# Patient Record
Sex: FEMALE | Race: OTHER | NOT HISPANIC OR LATINO | URBAN - METROPOLITAN AREA
[De-identification: names, ages, dates, MRNs, and addresses within clinical notes are randomized per-mention and may not be internally consistent; named-entity substitution may affect disease eponyms.]

---

## 2022-04-29 ENCOUNTER — HOSPITAL ENCOUNTER (OUTPATIENT)
Dept: BONE DENSITY | Facility: MEDICAL CENTER | Age: 57
Discharge: HOME | End: 2022-04-29
Payer: COMMERCIAL

## 2022-04-29 DIAGNOSIS — M85.80 OSTEOPENIA, UNSPECIFIED LOCATION: ICD-10-CM

## 2022-04-29 PROCEDURE — 77080 DXA BONE DENSITY AXIAL: CPT

## 2022-05-03 NOTE — RESULT ENCOUNTER NOTE
Please call the patient regarding her abnormal result  Bone density scan showed slight bone loss needs calcium and vitamin d like oscal d 500 bid for life

## 2022-10-17 ENCOUNTER — HOSPITAL ENCOUNTER (OUTPATIENT)
Dept: RADIOLOGY | Facility: HOSPITAL | Age: 57
Discharge: HOME | End: 2022-10-17
Payer: COMMERCIAL

## 2022-10-17 ENCOUNTER — HOSPITAL ENCOUNTER (OUTPATIENT)
Dept: NON INVASIVE DIAGNOSTICS | Facility: HOSPITAL | Age: 57
Discharge: HOME | End: 2022-10-17
Payer: COMMERCIAL

## 2022-10-17 VITALS — WEIGHT: 150 LBS | HEIGHT: 63 IN | BODY MASS INDEX: 26.58 KG/M2

## 2022-10-17 DIAGNOSIS — R06.02 SHORTNESS OF BREATH: ICD-10-CM

## 2022-10-17 LAB
BASELINE ST DEPRESSION: 0 MM
MAX HR PERCENT: 99 %
MAX HR: 162 BPM
RATE PRESSURE PRODUCT: NORMAL
SL CV STRESS RECOVERY BP: NORMAL MMHG
SL CV STRESS RECOVERY HR: 75 BPM
SL CV STRESS RECOVERY O2 SAT: 99 %
SL CV STRESS STAGE REACHED: 2
STRESS ANGINA INDEX: 0
STRESS BASELINE BP: NORMAL MMHG
STRESS BASELINE HR: 65 BPM
STRESS DUKE TREADMILL SCORE: -4
STRESS O2 SAT REST: 100 %
STRESS PEAK HR: 162 BPM
STRESS POST ESTIMATED WORKLOAD: 7 METS
STRESS POST EXERCISE DUR MIN: 6 MIN
STRESS POST O2 SAT PEAK: 100 %
STRESS POST PEAK BP: 170 MMHG
STRESS ST DEPRESSION: 2 MM

## 2022-10-17 PROCEDURE — 93017 CV STRESS TEST TRACING ONLY: CPT

## 2022-10-17 PROCEDURE — 93018 CV STRESS TEST I&R ONLY: CPT

## 2022-10-17 PROCEDURE — 93016 CV STRESS TEST SUPVJ ONLY: CPT

## 2022-10-17 PROCEDURE — 71046 X-RAY EXAM CHEST 2 VIEWS: CPT

## 2022-10-18 LAB
CHEST PAIN STATEMENT: NORMAL
MAX DIASTOLIC BP: 86 MMHG
MAX HEART RATE: 162 BPM
MAX PREDICTED HEART RATE: 163 BPM
MAX. SYSTOLIC BP: 170 MMHG
PROTOCOL NAME: NORMAL
TARGET HR FORMULA: NORMAL
TEST INDICATION: NORMAL
TIME IN EXERCISE PHASE: NORMAL

## 2023-01-20 ENCOUNTER — OFFICE VISIT (OUTPATIENT)
Dept: CARDIOLOGY CLINIC | Facility: CLINIC | Age: 58
End: 2023-01-20

## 2023-01-20 VITALS
WEIGHT: 155 LBS | SYSTOLIC BLOOD PRESSURE: 100 MMHG | BODY MASS INDEX: 27.46 KG/M2 | HEART RATE: 69 BPM | DIASTOLIC BLOOD PRESSURE: 70 MMHG | HEIGHT: 63 IN | OXYGEN SATURATION: 98 %

## 2023-01-20 DIAGNOSIS — R06.02 SHORTNESS OF BREATH: ICD-10-CM

## 2023-01-20 DIAGNOSIS — R94.39 ABNORMAL STRESS ECG WITH TREADMILL: Primary | ICD-10-CM

## 2023-01-20 DIAGNOSIS — E78.2 MIXED HYPERLIPIDEMIA: ICD-10-CM

## 2023-01-20 PROBLEM — E78.5 HYPERLIPIDEMIA: Status: ACTIVE | Noted: 2023-01-20

## 2023-01-20 RX ORDER — ASPIRIN 81 MG/1
81 TABLET ORAL DAILY
Qty: 30 TABLET | Refills: 11 | Status: SHIPPED | OUTPATIENT
Start: 2023-01-20

## 2023-01-20 NOTE — PROGRESS NOTES
[facility]    Name:Edmund Brian   DOS: 1/20/2023     Chief Complaint:   Chief Complaint   Patient presents with   • New Patient Visit     Stress test abnormal, sob  • Palpitations     Off and on , fast, rapid heart beats about once a month, get sob when it happens   • Shortness of Breath     Off and on at rest    • Dizziness     Only last week, dizziness with nausea       HISTORY OF PRESENT ILLNESS:      HPI:  Sofia Nelson is a 62 y o  female  Past medical history is significant for osteoporosis, hyperlipidemia, family history of CAD  Presents to establish care for evaluation of a positive stress test  The study was ordered for evaluation of dyspnea at rest and with exertion  She is primarily Sami speaking, and interpretation was assisted by her daughter who is fluent in both Sami and [employer]  The patient was offered an Sami interpretor via iPad, but declined this service  Today, the patient reports that she has no residual active cardiopulmonary complaints  The patient has no chest pain at rest or with exertion  She additionally denies diaphoresis, dizziness, palpitations, orthopnea, PND, edema, syncope, claudication  She states that the level of exertion achieved during her stress test was substantially higher than her usual level of exertion on a daily basis  Her mother has had multiple MIs  Grandmother with MI and stent placement  Brother of an MI at 79 which he passed away from  The 10-year ASCVD risk score (Irvin DK, et al , 2019) is: 1 9%    Values used to calculate the score:      Age: 62 years      Sex: Female      Is Non- : No      Diabetic: No      Tobacco smoker: No      Systolic Blood Pressure: 530 mmHg      Is BP treated: No      HDL Cholesterol: 60 mg/dL      Total Cholesterol: 273 mg/dL     ROS    ROS: Pertinent positives and negatives as described in History of Present Illness   Remainder of a 14 point review of systems was negative  No Known Allergies     Current Outpatient Medications on File Prior to Visit   Medication Sig Dispense Refill   • alendronate (Fosamax) 70 mg tablet Take once weekly 12 tablet 3   • calcium carbonate-vitamin D (OSCAL-D) 500 mg-200 units per tablet Take 1 tablet by mouth 2 (two) times a day with meals 180 tablet 3   • cyclobenzaprine (FLEXERIL) 5 mg tablet Take 1 tablet (5 mg total) by mouth 3 (three) times a day as needed for muscle spasms for up to 30 doses (Patient not taking: Reported on 1/20/2023) 30 tablet 0   • ergocalciferol (VITAMIN D2) 50,000 units Take 1 capsule (50,000 Units total) by mouth once a week (Patient not taking: Reported on 1/20/2023) 12 capsule 2   • methylPREDNISolone 4 MG tablet therapy pack Use as directed on package (Patient not taking: Reported on 1/20/2023) 21 each 0   • naproxen (NAPROSYN) 500 mg tablet Take 1 tablet (500 mg total) by mouth 2 (two) times a day with meals (Patient not taking: Reported on 1/20/2023) 20 tablet 0   • rosuvastatin (CRESTOR) 10 MG tablet Take 1 tablet (10 mg total) by mouth daily (Patient not taking: Reported on 1/20/2023) 90 tablet 3     No current facility-administered medications on file prior to visit  No past medical history on file  No past surgical history on file  No family history on file      Social History     Socioeconomic History   • Marital status: /Civil Union     Spouse name: Not on file   • Number of children: Not on file   • Years of education: Not on file   • Highest education level: Not on file   Occupational History   • Not on file   Tobacco Use   • Smoking status: Never   • Smokeless tobacco: Never   Substance and Sexual Activity   • Alcohol use: Not on file   • Drug use: Not on file   • Sexual activity: Not on file   Other Topics Concern   • Not on file   Social History Narrative   • Not on file     Social Determinants of Health     Financial Resource Strain: Not on file   Food Insecurity: Not on file Transportation Needs: Not on file   Physical Activity: Not on file   Stress: Not on file   Social Connections: Not on file   Intimate Partner Violence: Not on file   Housing Stability: Not on file       OBJECTIVE:    /70 (BP Location: Left arm, Patient Position: Sitting, Cuff Size: Standard)   Pulse 69   Ht 5' 3" (1 6 m)   Wt 70 3 kg (155 lb)   SpO2 98%   BMI 27 46 kg/m²      BP Readings from Last 3 Encounters:   01/20/23 100/70   12/20/22 138/90   09/29/22 114/62       Wt Readings from Last 3 Encounters:   01/20/23 70 3 kg (155 lb)   12/20/22 70 8 kg (156 lb)   10/17/22 68 kg (150 lb)         Physical Exam  Vitals reviewed  Constitutional:       General: She is not in acute distress  Appearance: Normal appearance  She is not diaphoretic  HENT:      Head: Normocephalic and atraumatic  Eyes:      Conjunctiva/sclera: Conjunctivae normal    Neck:      Vascular: No carotid bruit or JVD  Cardiovascular:      Rate and Rhythm: Normal rate and regular rhythm  Pulses: Normal pulses  Heart sounds: Normal heart sounds  No murmur heard  No friction rub  No gallop  Pulmonary:      Effort: Pulmonary effort is normal       Breath sounds: Normal breath sounds  No wheezing, rhonchi or rales  Abdominal:      General: Abdomen is flat  Bowel sounds are normal  There is no distension  Palpations: Abdomen is soft  Musculoskeletal:      Right lower leg: No edema  Left lower leg: No edema  Skin:     General: Skin is warm and dry  Neurological:      General: No focal deficit present  Mental Status: She is alert and oriented to person, place, and time  Psychiatric:         Mood and Affect: Mood normal          Behavior: Behavior normal                                                        Cardiac testing:   EKG reviewed personally: Normal sinus rhythm            LABS:  Lab Results   Component Value Date    BUN 16 10/10/2022    CREATININE 0 53 10/10/2022    CALCIUM 9 4 10/10/2022    K 4 0 10/10/2022    CO2 30 10/10/2022     10/10/2022    ALKPHOS 47 10/10/2022    AST 14 10/10/2022    ALT 12 10/10/2022        Lab Results   Component Value Date    WBC 4 7 10/10/2022    HGB 13 4 10/10/2022    HCT 40 2 10/10/2022    MCV 85 9 10/10/2022     (L) 10/10/2022       Lab Results   Component Value Date    HDL 60 10/10/2022    LDLCALC 185 (H) 10/10/2022    TRIG 142 10/10/2022       No results found for: HGBA1C    No results found for: TSH      ASSESSMENT/PLAN:  Diagnoses and all orders for this visit:    Abnormal stress ECG with treadmill  - Positive exercise stress study, although I suspect that this is an incidental finding as the patient's initial complaint was seemingly non-cardiac by history  Discussed the role for medical management vs further evaluation via cath or CTA  I discussed the sensitivity and specificity of an exercise stress ECG in a low-intermediate pretest risk patient  Patient prefers a definitive diagnosis prior to pursuing coronary angiography, and therefore I have recommended a CTA for evaluation of coronary anatomy and assess for flow limiting coronary disease  Start Aspirin 81mg QD  Metoprolol tartrate to be started 2 days prior to her CTA to achieve a heart rate of 50-60bpm for the study    -     POCT ECG  -     aspirin (ECOTRIN LOW STRENGTH) 81 mg EC tablet; Take 1 tablet (81 mg total) by mouth daily  -     CTA cardiac; Future  -     Basic metabolic panel; Future  -     metoprolol tartrate (LOPRESSOR) 25 mg tablet; Take 0 5 tablets (12 5 mg total) by mouth every 12 (twelve) hours    Shortness of breath  Suspect that this is her anginal equivalent  Workup as noted above  -     POCT ECG    Mixed hyperlipidemia  - Restart Crestor 10mg QD  Recheck lipids in 3 months  -     Lipid Panel with Direct LDL reflex;  Future                Nilam Clinton MD

## 2023-01-20 NOTE — PATIENT INSTRUCTIONS
You were seen today in the Cardiology office for evaluation of a positive stress test      Please continue your current cardiac medications as prescribed  Please make the following changes to your cardiac medications:  Start taking Aspirin 81mg once daily  Thank you for choosing [address]  Please call our office or use [employer] with any questions

## 2023-03-16 ENCOUNTER — HOSPITAL ENCOUNTER (OUTPATIENT)
Dept: CT IMAGING | Facility: HOSPITAL | Age: 58
Discharge: HOME | End: 2023-03-16
Attending: INTERNAL MEDICINE

## 2023-03-16 VITALS — DIASTOLIC BLOOD PRESSURE: 78 MMHG | HEART RATE: 59 BPM | SYSTOLIC BLOOD PRESSURE: 121 MMHG | RESPIRATION RATE: 20 BRPM

## 2023-03-16 DIAGNOSIS — R94.39 ABNORMAL STRESS ECG WITH TREADMILL: ICD-10-CM

## 2023-03-16 RX ORDER — METOPROLOL TARTRATE 5 MG/5ML
5 INJECTION INTRAVENOUS
Status: DISCONTINUED | OUTPATIENT
Start: 2023-03-16 | End: 2023-03-17 | Stop reason: HOSPADM

## 2023-03-16 RX ORDER — IODIXANOL 320 MG/ML
100 INJECTION, SOLUTION INTRAVASCULAR
Status: COMPLETED | OUTPATIENT
Start: 2023-03-16 | End: 2023-03-16

## 2023-03-16 RX ORDER — NITROGLYCERIN 0.4 MG/1
0.4 TABLET SUBLINGUAL ONCE
Status: COMPLETED | OUTPATIENT
Start: 2023-03-16 | End: 2023-03-16

## 2023-03-16 RX ADMIN — NITROGLYCERIN 0.4 MG: 0.4 TABLET SUBLINGUAL at 11:15

## 2023-03-16 RX ADMIN — IODIXANOL 100 ML: 320 INJECTION, SOLUTION INTRAVASCULAR at 11:22

## 2023-03-16 NOTE — NURSING NOTE
Patient arrived for cardiac cta  Test/medications reviewed  Pt accompanied by family member to drive and interpret, [employer]  declined  See vitals flowsheet  Pt tolerated test well  Encouraged increased fluids remainder of day  Asymptomatic upon departure with family

## 2023-03-24 ENCOUNTER — OFFICE VISIT (OUTPATIENT)
Dept: CARDIOLOGY CLINIC | Facility: CLINIC | Age: 58
End: 2023-03-24

## 2023-03-24 VITALS
OXYGEN SATURATION: 96 % | DIASTOLIC BLOOD PRESSURE: 60 MMHG | BODY MASS INDEX: 27.63 KG/M2 | HEART RATE: 66 BPM | WEIGHT: 156 LBS | SYSTOLIC BLOOD PRESSURE: 114 MMHG

## 2023-03-24 DIAGNOSIS — R94.39 ABNORMAL STRESS ECG WITH TREADMILL: Primary | ICD-10-CM

## 2023-03-24 DIAGNOSIS — E78.2 MIXED HYPERLIPIDEMIA: ICD-10-CM

## 2023-03-24 DIAGNOSIS — R06.02 SHORTNESS OF BREATH: ICD-10-CM

## 2023-03-24 NOTE — PATIENT INSTRUCTIONS
You were seen today in the Cardiology office for follow up evaluation  Please continue your current cardiac medications as prescribed  Thank you for choosing [address]  Please call our office or use [employer] with any questions

## 2023-03-27 NOTE — PROGRESS NOTES
[facility]    Name:Edmund Brian   DOS: 3/26/2023     Chief Complaint:   Chief Complaint   Patient presents with   • Follow-up       HISTORY OF PRESENT ILLNESS:      HPI:  Kira Nugent is a 62 y o  female  She  has no past medical history on file  She presents for follow up evaluation  Last saw me in the office on 1/20/23  Per my OV note at that time:  Presents to establish care for evaluation of a positive stress test  The study was ordered for evaluation of dyspnea at rest and with exertion  She states that the level of exertion achieved during her stress test was substantially higher than her usual level of exertion on a daily basis  Her mother has had multiple MIs  Grandmother with MI and stent placement  Brother of an MI at 79 which he passed away from  Based upon my personal review of her stress test and her family history, I had referred her for a CTA to exclude flow limiting coronary disease  The study demonstrated no occlusive coronary disease, and noted a coronary calcium score of 0  The patient today reports no active cardiopulmonary complaints, and specifically denies chest pain, shortness of breath, diaphoresis, dizziness, palpitations, orthopnea, PND, edema, syncope  She has not been taking rosuvastatin, despite being recommended to do so by her PCP  ROS    ROS: Pertinent positives and negatives as described in History of Present Illness  Remainder of a 14 point review of systems was negative       No Known Allergies     Current Outpatient Medications on File Prior to Visit   Medication Sig Dispense Refill   • alendronate (Fosamax) 70 mg tablet Take once weekly 12 tablet 3   • cyclobenzaprine (FLEXERIL) 5 mg tablet Take 1 tablet (5 mg total) by mouth 3 (three) times a day as needed for muscle spasms for up to 30 doses 30 tablet 0   • ergocalciferol (VITAMIN D2) 50,000 units Take 1 capsule (50,000 Units total) by mouth once a week 12 capsule 2   • meclizine (ANTIVERT) 25 mg tablet Take 1 tablet (25 mg total) by mouth every 8 (eight) hours 30 tablet 0   • methylPREDNISolone 4 MG tablet therapy pack Use as directed on package 21 each 0   • metoprolol tartrate (LOPRESSOR) 25 mg tablet Take 0 5 tablets (12 5 mg total) by mouth every 12 (twelve) hours 5 tablet 0   • naproxen (NAPROSYN) 500 mg tablet Take 1 tablet (500 mg total) by mouth 2 (two) times a day with meals 20 tablet 0   • rosuvastatin (CRESTOR) 10 MG tablet Take 1 tablet (10 mg total) by mouth daily 90 tablet 3   • calcium carbonate-vitamin D (OSCAL-D) 500 mg-200 units per tablet Take 1 tablet by mouth 2 (two) times a day with meals 180 tablet 3     No current facility-administered medications on file prior to visit  No past medical history on file  No past surgical history on file  No family history on file      Social History     Socioeconomic History   • Marital status: /Civil Union     Spouse name: Not on file   • Number of children: Not on file   • Years of education: Not on file   • Highest education level: Not on file   Occupational History   • Not on file   Tobacco Use   • Smoking status: Never   • Smokeless tobacco: Never   Substance and Sexual Activity   • Alcohol use: Not on file   • Drug use: Not on file   • Sexual activity: Not on file   Other Topics Concern   • Not on file   Social History Narrative   • Not on file     Social Determinants of Health     Financial Resource Strain: Not on file   Food Insecurity: Not on file   Transportation Needs: Not on file   Physical Activity: Not on file   Stress: Not on file   Social Connections: Not on file   Intimate Partner Violence: Not on file   Housing Stability: Not on file       OBJECTIVE:    /60 (BP Location: Right arm, Patient Position: Sitting, Cuff Size: Standard)   Pulse 66   Wt 70 8 kg (156 lb)   SpO2 96%   BMI 27 63 kg/m²      BP Readings from Last 3 Encounters:   03/24/23 114/60   03/16/23 121/78   01/20/23 100/70 Wt Readings from Last 3 Encounters:   03/24/23 70 8 kg (156 lb)   01/20/23 70 3 kg (155 lb)   12/20/22 70 8 kg (156 lb)         Physical Exam  Vitals reviewed  Constitutional:       General: She is not in acute distress  Appearance: Normal appearance  She is not diaphoretic  HENT:      Head: Normocephalic and atraumatic  Eyes:      Conjunctiva/sclera: Conjunctivae normal    Neck:      Vascular: No JVD  Cardiovascular:      Rate and Rhythm: Normal rate and regular rhythm  Pulses: Normal pulses  Heart sounds: Normal heart sounds  No murmur heard  No friction rub  No gallop  Pulmonary:      Effort: Pulmonary effort is normal       Breath sounds: Normal breath sounds  No wheezing, rhonchi or rales  Abdominal:      General: Abdomen is flat  Bowel sounds are normal  There is no distension  Palpations: Abdomen is soft  Musculoskeletal:      Right lower leg: No edema  Left lower leg: No edema  Skin:     General: Skin is warm and dry  Neurological:      General: No focal deficit present  Mental Status: She is alert and oriented to person, place, and time  Psychiatric:         Mood and Affect: Mood normal          Behavior: Behavior normal                                                        Cardiac testing:   CTA Coronary 3/16/23:  No coronary artery atherosclerotic disease      CAD-RADS 0     - Degree of maximal coronary stenosis = 0%   - Documented absence of coronary artery disease          LABS:  Lab Results   Component Value Date    BUN 16 10/10/2022    CREATININE 0 53 10/10/2022    CALCIUM 9 4 10/10/2022    K 4 0 10/10/2022    CO2 30 10/10/2022     10/10/2022    ALKPHOS 47 10/10/2022    AST 14 10/10/2022    ALT 12 10/10/2022        Lab Results   Component Value Date    WBC 4 7 10/10/2022    HGB 13 4 10/10/2022    HCT 40 2 10/10/2022    MCV 85 9 10/10/2022     (L) 10/10/2022       Lab Results   Component Value Date    HDL 60 10/10/2022    [address] 185 (H) 10/10/2022    TRIG 142 10/10/2022       No results found for: HGBA1C    No results found for: TSH      ASSESSMENT/PLAN:  Diagnoses and all orders for this visit:    Abnormal stress ECG with treadmill  Shortness of breath  Likely a false positive exercise stress ECG in the setting of low pre-test risk to start based upon history  Her CTA demonstrates no obstructive CAD with zero coronary calcium  I provided reassurance, and have advised the patient regarding a heart healthy diet  I stressed the importance of regular exercise, and compliance with her statin  I did explain that the absence of flow limiting disease/coronary calcium on her study does exclude the risk of having an MI in the near or distant future  We reviewed the pathophysiology of plaque rupture  She was counseled on red flag symptoms that would warrant urgent ER evaluation  I recommended daily exercise, mild to moderate intensity walking 30 minutes per day, 5 days per week  May follow up PRN  Mixed hyperlipidemia  Continue Rosuvastatin 10mg QD  Repeat lipids in 3 months  Further management per PCP                Agnes Martin MD